# Patient Record
Sex: MALE | Race: WHITE
[De-identification: names, ages, dates, MRNs, and addresses within clinical notes are randomized per-mention and may not be internally consistent; named-entity substitution may affect disease eponyms.]

---

## 2022-02-27 ENCOUNTER — HOSPITAL ENCOUNTER (EMERGENCY)
Dept: HOSPITAL 75 - ER FS | Age: 27
Discharge: HOME | End: 2022-02-27
Payer: COMMERCIAL

## 2022-02-27 VITALS — HEIGHT: 68.98 IN | WEIGHT: 169.98 LBS | BODY MASS INDEX: 25.18 KG/M2

## 2022-02-27 VITALS — SYSTOLIC BLOOD PRESSURE: 139 MMHG | DIASTOLIC BLOOD PRESSURE: 92 MMHG

## 2022-02-27 DIAGNOSIS — Z72.0: ICD-10-CM

## 2022-02-27 DIAGNOSIS — K02.9: Primary | ICD-10-CM

## 2022-02-27 PROCEDURE — 99283 EMERGENCY DEPT VISIT LOW MDM: CPT

## 2022-02-27 NOTE — ED EENT
History of Present Illness


General


Chief Complaint:  Dental Problems/Pain


Stated Complaint:  UPPER JAW PAIN


Source:  patient





History of Present Illness


Date Seen by Provider:  Feb 27, 2022


Time Seen by Provider:  19:30


Initial Comments


Patient is a 27-year-old male presents with right sided upper dental/maxillary 

pain for 3 days.  Patient reports intermittent pain secondary to dental decay 

for the past 2 months.  Increased pain prior to ED arrival.  Patient did take 

Advil.  He has not yet sought follow-up appointment with the dentist.  Denies 

dysphonia trismus drooling or dysphagia.  No other acute symptoms or complaints.


Timing/Duration:  gradual


Severity:  moderate


Prearrival Treatment:  other


Modifying Factors:  Improves With Other


Associated Symptoms:  other





Allergies and Home Medications


Allergies


Coded Allergies:  


     No Known Drug Allergies (Unverified , 2/27/22)





Patient Home Medication List


Home Medication List Reviewed:  Yes





Review of Systems


Review of Systems


Constitutional:  see HPI


Eyes:  See HPI


Ears:  See HPI


Nose:  see HPI


Mouth:  no symptoms reported, see HPI


Throat:  see HPI


Respiratory:  see HPI


Cardiovascular:  see HPI


Musculoskeletal:  see HPI


Skin:  see HPI


Neurological:  See HPI


Hematologic/Lymphatic:  See HPI


Immunological/Allergic:  see HPI





All Other Systems Reviewed


Negative Unless Noted:  Yes





Past Medical-Social-Family Hx


Patient Social History


Tobacco Use?:  Yes


Substance use?:  No


Alcohol Use?:  No


Pt feels they are or have been:  No





Immunizations Up To Date


COVID19 Vaccine :  Moderna





Physical Exam


Height, Weight, BMI


Height: '"


Weight: lbs. oz. kg;  BMI


Method:


General Appearance:  mild distress


Eyes:  bilateral eye normal inspection, bilateral eye PERRL


Ears:  bilateral ear auricle normal, bilateral ear TM normal


Nose:  normal inspection


Mouth/Throat:  pharynx normal; No trismus, No uvula swelling; other (Right upper

maxillary dental pain/tenderness with gingival swelling and tenderness)


Neck:  non-tender, full range of motion


Neurologic/Psychiatric:  no motor/sensory deficits, alert, oriented x 3


Skin:  normal color





Progress/Results/Core Measures


Results/Orders


My Orders





Orders - PAUL CADE DO


Oxycodone/Apap 5/325mg Tablet (Percocet (2/27/22 20:00)


Amoxicillin/Clavulanate Tablet (Augmenti (2/27/22 20:15)








Departure


Communication (Admissions)


Dental pain secondary to dental caries with caries.  First dose of antibiotics 

and pain medication given in the ED.





Impression





   Primary Impression:  


   Pain due to dental caries


Disposition:  01 HOME, SELF-CARE


Condition:  Stable





Departure-Patient Inst.


Decision time for Depature:  20:07


Referrals:  


NO,LOCAL PHYSICIAN (PCP/Family)


Primary Care Physician


Patient Instructions:  Dental Pain





Add. Discharge Instructions:  


Please take 600 mg of ibuprofen 3 times daily and newly prescribed medication as

directed and follow-up with your dentist in 3 to 5 days.  Return to the ED if 

new or worsening symptoms.





All discharge instructions reviewed with patient and/or family. Voiced 

understanding.


Scripts


Hydrocodone/Acetaminophen (Hydrocodone-Acetamin 5-325 mg) 1 Each Tablet


1 TAB PO Q4H PRN for PAIN-MODERATE (5-7), #10 TAB


   Prov: PAUL CADE DO         2/27/22 


Penicillin V Potassium (Penicillin V Potassium) 500 Mg Tablet


500 MG PO QID, #40 TAB


   Prov: PAUL CADE DO         2/27/22











PAUL CADE DO                   Feb 27, 2022 20:08

## 2023-03-01 ENCOUNTER — HOSPITAL ENCOUNTER (EMERGENCY)
Dept: HOSPITAL 75 - ER FS | Age: 28
Discharge: HOME | End: 2023-03-01
Payer: COMMERCIAL

## 2023-03-01 VITALS — SYSTOLIC BLOOD PRESSURE: 152 MMHG | DIASTOLIC BLOOD PRESSURE: 104 MMHG

## 2023-03-01 VITALS — WEIGHT: 200.4 LBS | HEIGHT: 68.98 IN | BODY MASS INDEX: 29.68 KG/M2

## 2023-03-01 DIAGNOSIS — K01.1: Primary | ICD-10-CM

## 2023-03-01 DIAGNOSIS — Z98.818: ICD-10-CM

## 2023-03-01 PROCEDURE — 99283 EMERGENCY DEPT VISIT LOW MDM: CPT

## 2023-03-01 NOTE — ED EENT
History of Present Illness


General


Chief Complaint:  Dental Problems/Pain


Stated Complaint:  INFECTED WISDOM TOOTH, PAIN


Source:  patient





History of Present Illness


Date Seen by Provider:  Mar 1, 2023


Time Seen by Provider:  18:44


Initial Comments


28-year-old male presenting with complaints of increasing pain to his lower 

wisdom teeth.  He primarily is having pain on the right side that has been 

getting worse over the last several days.  He had been taking over-the-counter A

leve or naproxen but it was not helping with his pain anymore.  He does have an 

appointment towards the end of the month to see dental surgeon and have his 

impacted wisdom tooth extracted.  He had similar issues a year ago and had to 

have the upper wisdom teeth extracted.  He denies having fever, chills, nausea, 

vomiting, facial swelling.  He also has been trying to use over-the-counter 

Anbesol to numb the pain.


Timing/Duration:  gradual


Severity:  severe


Location:  dental


Prearrival Treatment:  over the counter meds


Modifying Factors:  Worse With Other (eating and drinking makes it worse)


Associated Symptoms:  No change in hearing, No cough, No drooling, No ear 

drainage, No facial pain/swelling, No fever, No malaise, No nasal 

congestion/drainage, No poor fluid intake, No poor solids intake, No sinus 

infection, No sore throat; tooth pain; No voice change





Allergies and Home Medications


Allergies


Coded Allergies:  


     No Known Drug Allergies (Unverified , 2/27/22)





Patient Home Medication List


Home Medication List Reviewed:  Yes


Amoxicillin/Potassium Clav (Amox Tr-K Clv 875-125 mg Tab) 875 Mg-125 Mg Tablet, 

1 EACH PO BID


   Prescribed by: MARCOS ORTIZ on 3/1/23 1858


Hydrocodone/Acetaminophen (Hydrocodone-Acetamin 5-325 mg) 5 Mg-325 Mg Tablet, 1 

TAB PO Q4H PRN for PAIN-SEVERE (8-10)


   Prescribed by: MARCOS ORTIZ on 3/1/23 1859


Discontinued Medications


Hydrocodone/Acetaminophen (Hydrocodone-Acetamin 5-325 mg) 1 Each Tablet, 1 TAB 

PO Q4H PRN for PAIN-MODERATE (5-7)


   Prescribed by: PAUL CADE on 2/27/22 2009


Penicillin V Potassium (Penicillin V Potassium) 500 Mg Tablet, 500 MG PO QID


   Prescribed by: PAUL CADE on 2/27/22 2008





Review of Systems


Review of Systems


Constitutional:  No chills, No fever


Eyes:  No Symptoms Reported


Ears:  No Symptoms Reported


Nose:  no symptoms reported


Mouth:  see HPI


Throat:  no symptoms reported


Respiratory:  no symptoms reported


Cardiovascular:  no symptoms reported


Gastrointestinal:  no symptoms reported


Musculoskeletal:  no symptoms reported


Skin:  no symptoms reported


Neurological:  No Symptoms Reported





Past Medical-Social-Family Hx


Past Medical History


Surgery/Hospitalization HX:  


Maxillary wisdom teeth extraction





Physical Exam


Height, Weight, BMI


Height: '"


Weight: lbs. oz. kg; 25.00 BMI


Method:


General Appearance:  WD/WN, no apparent distress


Eyes:  bilateral eye PERRL, bilateral eye EOMI


Mouth/Throat:  pharynx normal, dental tenderness; No trismus; other (bilateral 

wisdom tooth impaction of mandible. Tender to palpation right side. increased 

erythema and inflammation to gums around the wisdom teeth)


Neck:  non-tender, full range of motion, supple, normal inspection


Cardiovascular:  normal peripheral pulses


Neurologic/Psychiatric:  alert, oriented x 3


Skin:  normal color, warm/dry





Progress/Results/Core Measures


Results/Orders


My Orders





Orders - MARCOS ORTIZ MD


Amoxicillin/Clavulanate Tablet (Augmenti (3/1/23 18:54)


Rx-Hydrocodone/Apap 5-325 Mg (Rx-Vicodin (3/1/23 19:00)








Progress


Progress Note :  


Progress Note


Counseled patient on use of over-the-counter pain medicine of acetaminophen 650 

mg every 6 hours as needed for pain in addition to naproxen or Aleve 2 pills 

every 12 hours as needed for pain and inflammation.  Alternatively he could take

ibuprofen or Advil 800 mg or 4 over-the-counter pills every 8 hours as needed 

for pain and inflammation.  Take the full course of antibiotics, 

amoxicillin/clavulanic acid 875/125 1 pill every 12 hours x10 days.  For severe 

pain hydrocodone/acetaminophen 5/325 mg 1 by mouth every 4 hours as needed for 

severe pain for the first 3 days.  Dispense 15 tablets for home and send with 4 

take-home pills  for pain tonight since the pharmacies are already closed.  

Given the first dose of antibiotics, Augmentin 875 p.o. x1 here in the ED.  

Again this was due to the pharmacies being closed until tomorrow morning.  

Encouraged to establish care with a primary care provider for continued pain 

management if needed.  Continue to follow-up with a dentist for definitive 

management and care.  Keep appointment in 3 weeks for dental extraction of 

impacted wisdom teeth





Departure


Impression





   Primary Impression:  


   Tooth, impacted


   Additional Impression:  


   Pain, dental


Disposition:  01 HOME, SELF-CARE


Condition:  Stable





Departure-Patient Inst.


Decision time for Depature:  18:57


Referrals:  


NO,LOCAL PHYSICIAN (PCP)


Primary Care Physician








[facility] [place] [facility] [place] [facility]


Patient Instructions:  Dental Pain ED, Impacted Tooth





Add. Discharge Instructions:  


Continue using over-the-counter naproxen or Aleve to help with pain and 

inflammation.  You could alternatively try using ibuprofen or Advil/Motrin to 

help with pain and inflammation.


Take the full course of antibiotics to help treat for possible infection making 

her pain worse.





You could use the stronger pain medicine of hydrocodone/acetaminophen 5/325 mg 1

every 4 hours as needed for severe pain for the first 3 days.





Follow-up with primary care provider for continued pain control if needed prior 

to your dental surgery





All discharge instructions reviewed with patient and/or family. Voiced 

understanding.


Scripts


Hydrocodone/Acetaminophen (Hydrocodone-Acetamin 5-325 mg) 5 Mg-325 Mg Tablet


1 TAB PO Q4H PRN for PAIN-SEVERE (8-10) for 3 Days, #15 TAB 0 Refills


   Prov: MARCOS ORTIZ MD         3/1/23 


Amoxicillin/Potassium Clav (Amox Tr-K Clv 875-125 mg Tab) 875 Mg-125 Mg Tablet


1 EACH PO BID for dental infection for 10 Days, #20 TAB 0 Refills


   Prov: MARCOS ORTIZ MD         3/1/23











MARCOS ORTIZ MD                Mar 1, 2023 19:01